# Patient Record
Sex: FEMALE | Race: WHITE | Employment: PART TIME | URBAN - METROPOLITAN AREA
[De-identification: names, ages, dates, MRNs, and addresses within clinical notes are randomized per-mention and may not be internally consistent; named-entity substitution may affect disease eponyms.]

---

## 2020-10-13 ENCOUNTER — TRANSCRIBE ORDER (OUTPATIENT)
Dept: SCHEDULING | Age: 65
End: 2020-10-13

## 2020-10-13 DIAGNOSIS — E78.2 MIXED HYPERLIPIDEMIA: Primary | ICD-10-CM

## 2020-10-13 DIAGNOSIS — R07.2 PRECORDIAL PAIN: ICD-10-CM

## 2020-10-23 ENCOUNTER — HOSPITAL ENCOUNTER (OUTPATIENT)
Dept: CT IMAGING | Age: 65
Discharge: HOME | End: 2020-10-23
Attending: NURSE PRACTITIONER
Payer: SELF-PAY

## 2020-10-23 DIAGNOSIS — R07.2 PRECORDIAL PAIN: ICD-10-CM

## 2020-10-23 DIAGNOSIS — E78.2 MIXED HYPERLIPIDEMIA: ICD-10-CM

## 2020-10-23 PROCEDURE — 75571 CT HRT W/O DYE W/CA TEST: CPT

## 2020-10-26 NOTE — CARDIO/PULMONARY
Reached patient at her given mobile number and shared her coronary artery calcium score of 172 with her. We discussed the meaning of this score and patient questions. Patient plans to follow up with her PCP, Kellie Saldivar NP. Patient has no further questions at this time.